# Patient Record
Sex: FEMALE | Race: WHITE | NOT HISPANIC OR LATINO | URBAN - METROPOLITAN AREA
[De-identification: names, ages, dates, MRNs, and addresses within clinical notes are randomized per-mention and may not be internally consistent; named-entity substitution may affect disease eponyms.]

---

## 2023-08-27 ENCOUNTER — OFFICE VISIT (OUTPATIENT)
Dept: URGENT CARE | Facility: CLINIC | Age: 33
End: 2023-08-27
Payer: COMMERCIAL

## 2023-08-27 VITALS
RESPIRATION RATE: 20 BRPM | HEIGHT: 65 IN | BODY MASS INDEX: 22.66 KG/M2 | HEART RATE: 102 BPM | SYSTOLIC BLOOD PRESSURE: 98 MMHG | WEIGHT: 136 LBS | OXYGEN SATURATION: 100 % | TEMPERATURE: 98 F | DIASTOLIC BLOOD PRESSURE: 68 MMHG

## 2023-08-27 DIAGNOSIS — J02.9 SORE THROAT: ICD-10-CM

## 2023-08-27 DIAGNOSIS — U07.1 COVID-19: Primary | ICD-10-CM

## 2023-08-27 LAB
CTP QC/QA: YES
CTP QC/QA: YES
MOLECULAR STREP A: NEGATIVE
SARS-COV-2 RDRP RESP QL NAA+PROBE: POSITIVE

## 2023-08-27 PROCEDURE — 87651 POCT STREP A MOLECULAR: ICD-10-PCS | Mod: QW,,,

## 2023-08-27 PROCEDURE — 99203 OFFICE O/P NEW LOW 30 MIN: CPT | Mod: ,,,

## 2023-08-27 PROCEDURE — 99203 PR OFFICE/OUTPT VISIT, NEW, LEVL III, 30-44 MIN: ICD-10-PCS | Mod: ,,,

## 2023-08-27 PROCEDURE — 87635: ICD-10-PCS | Mod: QW,,,

## 2023-08-27 PROCEDURE — 87635 SARS-COV-2 COVID-19 AMP PRB: CPT | Mod: QW,,,

## 2023-08-27 PROCEDURE — 87651 STREP A DNA AMP PROBE: CPT | Mod: QW,,,

## 2023-08-27 RX ORDER — NORETHINDRONE AND ETHINYL ESTRADIOL 1 MG-35MCG
1 KIT ORAL
COMMUNITY
Start: 2023-08-19

## 2023-08-27 RX ORDER — PREDNISONE 20 MG/1
20 TABLET ORAL DAILY
Qty: 5 TABLET | Refills: 0 | Status: SHIPPED | OUTPATIENT
Start: 2023-08-27 | End: 2023-09-01

## 2023-08-27 RX ORDER — ESCITALOPRAM OXALATE 10 MG/1
10 TABLET ORAL
COMMUNITY
Start: 2023-08-23

## 2023-08-27 NOTE — PROGRESS NOTES
"Subjective:      Patient ID: Samira Alcantar is a 32 y.o. female.    Vitals:  height is 5' 5" (1.651 m) and weight is 61.7 kg (136 lb). Her oral temperature is 98.2 °F (36.8 °C). Her blood pressure is 98/68 and her pulse is 102. Her respiration is 20 and oxygen saturation is 100%.     Chief Complaint: Sinus Problem (Sinus congestion, headache, bilat ear pressure, sore throat, sneezing  x 2 days. )    Patient is a 32-year-old female that presents complaining of sinus congestion, headache, bilateral ear pressure, sore throat and sneezing for the past 2 days. Patient denies any SOB, CP, rash, n/v/d, or neck stiffness.      Sinus Problem  Associated symptoms include congestion, ear pain, sinus pressure and a sore throat.       HENT:  Positive for ear pain, congestion, sinus pressure and sore throat.       Objective:     Physical Exam   Constitutional: She is oriented to person, place, and time.  Non-toxic appearance. She does not appear ill.   HENT:   Ears:   Right Ear: Tympanic membrane, external ear and ear canal normal.   Left Ear: Tympanic membrane, external ear and ear canal normal.      Comments: Clear fluid in bilateral ears  Nose: Congestion present.   Mouth/Throat: Posterior oropharyngeal erythema present.   Pulmonary/Chest: Effort normal and breath sounds normal.   Abdominal: Normal appearance and bowel sounds are normal. Soft. There is no abdominal tenderness.   Musculoskeletal: Normal range of motion.         General: Normal range of motion.   Neurological: She is alert and oriented to person, place, and time.   Skin: Skin is warm and dry. Capillary refill takes less than 2 seconds.   Psychiatric: Her behavior is normal. Mood normal.       Assessment:     1. COVID-19    2. Sore throat           Previous History      Review of patient's allergies indicates:   Allergen Reactions    Macrolide antibiotics        Past Medical History:   Diagnosis Date    Anxiety      Current Outpatient Medications   Medication " "Instructions    EScitalopram oxalate (LEXAPRO) 10 mg, Oral    NORTREL 1/35, 28, 1-35 mg-mcg per tablet 1 tablet, Oral    predniSONE (DELTASONE) 20 mg, Oral, Daily     Past Surgical History:   Procedure Laterality Date     SECTION       Family History   Problem Relation Age of Onset    No Known Problems Mother     No Known Problems Father     Diabetes Sister        Social History     Tobacco Use    Smoking status: Never    Smokeless tobacco: Never   Substance Use Topics    Alcohol use: Not Currently    Drug use: Never        Physical Exam      Vital Signs Reviewed   BP 98/68 (BP Location: Right arm)   Pulse 102   Temp 98.2 °F (36.8 °C) (Oral)   Resp 20   Ht 5' 5" (1.651 m)   Wt 61.7 kg (136 lb)   LMP 2023   SpO2 100%   BMI 22.63 kg/m²        Procedures    Procedures     Labs     Results for orders placed or performed in visit on 23   POCT COVID-19 Rapid Screening   Result Value Ref Range    POC Rapid COVID Positive (A) Negative     Acceptable Yes    POCT Strep A, Molecular   Result Value Ref Range    Molecular Strep A, POC Negative Negative     Acceptable Yes       Plan:       COVID-19    Sore throat  -     POCT COVID-19 Rapid Screening  -     POCT Strep A, Molecular  -     predniSONE (DELTASONE) 20 MG tablet; Take 1 tablet (20 mg total) by mouth once daily. for 5 days  Dispense: 5 tablet; Refill: 0      COVID-Take Tylenol (acetaminophen) for fever/aches.  Drink plenty of fluids.     Prednisone- to help with congestion/inflammation- take as prescribed- take with food      Take OTC cough/cold/congestion medication such as Dayquil/Nyquil.    May also take antihistamine such as Claritin/Zyrtec/Allegra.  Cepacol sore throat lozenges if needed.     Drink plenty of fluids.  Rest.     Go directly to ER if you experience any SOB, chest pain, or other worrisome symptoms.      If positive for Covid-19, you should stay in isolation until: 1) At least 5 days have passed " since your symptoms first appeared and  2) At least 24 hours have passed since recovery defined as resolution of fever without the use of fever-reducing medications and improvement in symptoms

## 2023-08-27 NOTE — LETTER
August 27, 2023    Samira Alcantar  [address] [place] [address]         [facility] at [facility]  Urgent Care  [address]  [place] [address]  Phone: [phone]  Fax: [phone] Below are the results from your recent visit:      Results for orders placed or performed in visit on 08/27/23   POCT COVID-19 Rapid Screening   Result Value Ref Range    POC Rapid COVID Positive (A) Negative     Acceptable Yes

## 2023-08-27 NOTE — LETTER
August 27, 2023      [facility]  [address]    [place] [address]  Phone: [phone]  Fax: [phone]       Patient: Samira Alcantar   YOB: 1990  Date of Visit: 08/27/2023    To Whom It May Concern:    Pedro Alcantar  was at [facility] on 08/27/2023. The patient may return to work/school on 08/31/2023 with no restrictions. If you have any questions or concerns, or if I can be of further assistance, please do not hesitate to contact me.    Sincerely,    Neena Shirley MA

## 2023-08-27 NOTE — PATIENT INSTRUCTIONS
COVID-Take Tylenol (acetaminophen) for fever/aches.  Drink plenty of fluids.     Prednisone- to help with congestion/inflammation- take as prescribed- take with food      Take OTC cough/cold/congestion medication such as Dayquil/Nyquil.    May also take antihistamine such as Claritin/Zyrtec/Allegra.  Cepacol sore throat lozenges if needed.     Drink plenty of fluids.  Rest.     Go directly to ER if you experience any SOB, chest pain, or other worrisome symptoms.      If positive for Covid-19, you should stay in isolation until: 1) At least 5 days have passed since your symptoms first appeared and  2) At least 24 hours have passed since recovery defined as resolution of fever without the use of fever-reducing medications and improvement in symptoms

## 2025-06-14 ENCOUNTER — OFFICE VISIT (OUTPATIENT)
Dept: URGENT CARE | Facility: CLINIC | Age: 35
End: 2025-06-14
Payer: COMMERCIAL

## 2025-06-14 VITALS
DIASTOLIC BLOOD PRESSURE: 85 MMHG | RESPIRATION RATE: 20 BRPM | OXYGEN SATURATION: 100 % | BODY MASS INDEX: 21.16 KG/M2 | SYSTOLIC BLOOD PRESSURE: 138 MMHG | TEMPERATURE: 98 F | HEIGHT: 65 IN | HEART RATE: 117 BPM | WEIGHT: 127 LBS

## 2025-06-14 DIAGNOSIS — K52.9 ACUTE GASTROENTERITIS: Primary | ICD-10-CM

## 2025-06-14 PROCEDURE — S0119 ONDANSETRON 4 MG: HCPCS | Mod: ,,, | Performed by: FAMILY MEDICINE

## 2025-06-14 PROCEDURE — 99213 OFFICE O/P EST LOW 20 MIN: CPT | Mod: ,,, | Performed by: FAMILY MEDICINE

## 2025-06-14 RX ORDER — FAMOTIDINE 20 MG/1
20 TABLET, FILM COATED ORAL 2 TIMES DAILY
Qty: 28 TABLET | Refills: 0 | Status: SHIPPED | OUTPATIENT
Start: 2025-06-14 | End: 2025-06-28

## 2025-06-14 RX ORDER — ONDANSETRON 4 MG/1
4 TABLET, ORALLY DISINTEGRATING ORAL EVERY 8 HOURS PRN
Qty: 10 TABLET | Refills: 0 | Status: SHIPPED | OUTPATIENT
Start: 2025-06-14

## 2025-06-14 RX ORDER — ONDANSETRON 4 MG/1
4 TABLET, ORALLY DISINTEGRATING ORAL
Status: COMPLETED | OUTPATIENT
Start: 2025-06-14 | End: 2025-06-14

## 2025-06-14 RX ADMIN — ONDANSETRON 4 MG: 4 TABLET, ORALLY DISINTEGRATING ORAL at 09:06

## 2025-06-14 NOTE — PROGRESS NOTES
"Subjective:      Patient ID: Samira Alcantar is a 34 y.o. female.    Vitals:  height is 5' 5" (1.651 m) and weight is 57.6 kg (127 lb). Her oral temperature is 98.3 °F (36.8 °C). Her blood pressure is 138/85 and her pulse is 117 (abnormal). Her respiration is 20 and oxygen saturation is 100%.     Chief Complaint: Nausea     Patient is a 34 y.o. female who presents to urgent care with complaints of nausea, vomiting, diarrhea x1 days.  Patient denies fever.       [place]:  34-year-old female otherwise healthy only prescription medication birth control.  Follows up with primary MD.  Present to clinic with concerns of nausea vomiting and diarrhea since yesterday.  States started with diarrhea yesterday morning, her mom with similar complaints.  Vomiting started last night, has been continuous.  Denies eating anything different or unusual.  No recent travel.  No other family member with similar complaints.  No fever, no abdominal pain.  No blood or mucus in the stools.  Denies history of acid reflux.  No change in her diet recently.  Reviewed nurse's notes, LMP May 28, 2025.  No concerns of pregnancy    ROS :  Constitutional : Negative for fever, Negative for weakness  HEENT : No sore throat,No earache  Neck : Negative except HPI  Respiratory : Negative for shortness of breath and wheezing  Cardiovascular : Negative for chest pain, no palpitations   Gastrointestinal : Negative except as documented in HPI  Genitourinary : Negative for burning urination, urgency and frequency  Integumentary : Negative for skin rash  Neurological : Negative except HPI   Objective:     Physical Exam  General : Alert and Oriented, No apparent distress, afebrile, appears anxious, clear speech  Neck - supple  HENT : Oropharynx no redness or swelling.  Oral mucous membrane pink and moist  Respiratory : Bilateral equal breath sounds, nonlabored respirations  Cardiovascular :  Heart rate 104 per minute, rhythm regular, normal volume pulse, no " murmur  Gastrointestinal: Full abdomen, soft, epigastric area tender to palpate, no guarding or rigidity.  No rebound tenderness.  Bowel sounds present, mildly hyperactive  Integumentary : Warm, Dry and no rash    Assessment:     1. Acute gastroenteritis      Plan:   Adequate hydration, forced fluids more like Gatorade and Powerade  Zofran 4 mg ODT in the clinic, tolerated well.  Zofran for nausea and vomiting every 8 hours as needed  Soft bland diet : BRAT Diet: Grits, Soup, Toast, Applesauce and Rice Cereal  Avoid spicy food, Avoid fried food ,soda drinks and caffeine drinks  Monitor the symptoms and advance diet once tolerating  Pepcid 20 mg twice daily, for at least 2 weeks.  ER precautions for worsening symptoms and with any acute change in symptoms.  Patient voiced understanding  Call or return to clinic for any questions. Follow-up with primary M.D.     Acute gastroenteritis  -     ondansetron (ZOFRAN-ODT) 4 MG TbDL; Take 1 tablet (4 mg total) by mouth every 8 (eight) hours as needed (for nausea and vomitting).  Dispense: 10 tablet; Refill: 0  -     famotidine (PEPCID) 20 MG tablet; Take 1 tablet (20 mg total) by mouth 2 (two) times daily. for 14 days  Dispense: 28 tablet; Refill: 0  -     ondansetron disintegrating tablet 4 mg

## 2025-06-14 NOTE — PATIENT INSTRUCTIONS
Adequate hydration, forced fluids more like Gatorade and Powerade  Zofran 4 mg ODT in the clinic, tolerated well.  Zofran for nausea and vomiting every 8 hours as needed  Soft bland diet : BRAT Diet: Grits, Soup, Toast, Applesauce and Rice Cereal  Avoid spicy food, Avoid fried food ,soda drinks and caffeine drinks  Monitor the symptoms and advance diet once tolerating  Pepcid 20 mg twice daily, for at least 2 weeks.  ER precautions for worsening symptoms and with any acute change in symptoms.  Patient voiced understanding  Call or return to clinic for any questions. Follow-up with primary M.D.